# Patient Record
Sex: FEMALE | Race: WHITE | NOT HISPANIC OR LATINO | Employment: OTHER | URBAN - METROPOLITAN AREA
[De-identification: names, ages, dates, MRNs, and addresses within clinical notes are randomized per-mention and may not be internally consistent; named-entity substitution may affect disease eponyms.]

---

## 2019-10-01 ENCOUNTER — HOSPITAL ENCOUNTER (EMERGENCY)
Facility: HOSPITAL | Age: 24
Discharge: HOME | End: 2019-10-01
Attending: EMERGENCY MEDICINE
Payer: MEDICAID

## 2019-10-01 VITALS
WEIGHT: 143.5 LBS | OXYGEN SATURATION: 99 % | RESPIRATION RATE: 18 BRPM | SYSTOLIC BLOOD PRESSURE: 129 MMHG | BODY MASS INDEX: 24.25 KG/M2 | HEART RATE: 109 BPM | TEMPERATURE: 98 F | DIASTOLIC BLOOD PRESSURE: 62 MMHG

## 2019-10-01 DIAGNOSIS — L23.81 ALLERGIC CONTACT DERMATITIS DUE TO ANIMAL DANDER: Primary | ICD-10-CM

## 2019-10-01 DIAGNOSIS — F41.9 ANXIETY: ICD-10-CM

## 2019-10-01 PROBLEM — O99.331 SMOKING (TOBACCO) COMPLICATING PREGNANCY, FIRST TRIMESTER: Status: ACTIVE | Noted: 2017-03-16

## 2019-10-01 PROBLEM — F31.9 BIPOLAR DISORDER: Status: ACTIVE | Noted: 2017-02-09

## 2019-10-01 PROBLEM — N92.6 IRREGULAR PERIODS: Status: ACTIVE | Noted: 2019-10-01

## 2019-10-01 PROBLEM — F90.0 ATTENTION DEFICIT HYPERACTIVITY DISORDER, PREDOMINANTLY INATTENTIVE TYPE: Status: ACTIVE | Noted: 2017-02-09

## 2019-10-01 PROBLEM — F41.1 GENERALIZED ANXIETY DISORDER: Status: ACTIVE | Noted: 2017-03-09

## 2019-10-01 PROBLEM — F91.3 OPPOSITIONAL DEFIANT DISORDER: Status: ACTIVE | Noted: 2017-02-09

## 2019-10-01 PROCEDURE — 99281 EMR DPT VST MAYX REQ PHY/QHP: CPT | Mod: ER

## 2019-10-02 NOTE — ED NOTES
Patient examined, evaluated, and educated on discharge prescriptions and instructions by Jeffrey AKBAR. Patient discharged to lobby by NP.

## 2019-10-02 NOTE — ED PROVIDER NOTES
"Encounter Date: 10/1/2019       History     Chief Complaint   Patient presents with    Foot Swelling     bilateral since this am.      The history is provided by the patient.   Rash    This is a new problem. The current episode started today. The problem has been unchanged. The problem is associated with animal contact. The rash is present on the right arm, back, left foot, right foot and left upper leg. The pain is at a severity of 9/10. The pain has been intermittent since onset. Associated symptoms include itching. Pertinent negatives include no blisters, no pain and no weeping. She has tried nothing for the symptoms. The treatment provided no relief. Risk factors include new environmental exposures.   The patient reports having "bilateral feet swelling" that started today. The patient states she was around dogs recently which she is allergic to, and attributes her foot swelling to this. The patient reports itching, bumps, and swelling of skin. The patient also states she has a psychiatric history and would like some resources for help with anxiety. She denies taking any medication at this time, and thinks she would benefit from outpatient treatment. The patient denies SI/HI, is alert/oriented, appropriate during exam, denies depressive or manic episodes. She reports anxiety associated with methamphetamine use. Also she admits to marijuana use.       PCP:    Jacob Vidal MD        Review of patient's allergies indicates:  No Known Allergies  Past Medical History:   Diagnosis Date    Attention deficit hyperactivity disorder, predominantly inattentive type 2017    Bipolar 1 disorder     Chlamydia infection     positive with pregnancy    Depression     Generalized anxiety disorder 3/9/2017    Irregular periods 10/1/2019    Mental disorder     anxiety    Noncompliance     Oppositional defiant disorder 2017     Past Surgical History:   Procedure Laterality Date     SECTION      " CHOLECYSTECTOMY  09/27/2011     Family History   Problem Relation Age of Onset    Kidney disease Mother         kidney stones    Ovarian cancer Mother     Diabetes Father     Breast cancer Neg Hx     Colon cancer Neg Hx      Social History     Tobacco Use    Smoking status: Current Every Day Smoker     Packs/day: 0.50     Years: 5.00     Pack years: 2.50     Types: Cigarettes    Smokeless tobacco: Never Used   Substance Use Topics    Alcohol use: No    Drug use: Yes     Comment: Former use of mariquana-last use , last year     Review of Systems   Constitutional: Negative for fever.   HENT: Negative for sore throat.    Respiratory: Negative for shortness of breath.    Cardiovascular: Negative for chest pain.   Gastrointestinal: Negative for nausea.   Genitourinary: Negative for dysuria.   Musculoskeletal: Negative for back pain.   Skin: Positive for itching and rash (R antecubital fossa, right upper arm, left upper thigh, and lower back.).   Neurological: Negative for weakness.   Hematological: Does not bruise/bleed easily.   Psychiatric/Behavioral: Negative for agitation, behavioral problems, confusion, decreased concentration, dysphoric mood, hallucinations, self-injury, sleep disturbance and suicidal ideas. The patient is nervous/anxious (reports). The patient is not hyperactive.        Physical Exam     Initial Vitals [10/01/19 1848]   BP Pulse Resp Temp SpO2   129/62 109 18 98.4 °F (36.9 °C) 99 %      MAP       --         Physical Exam    Nursing note and vitals reviewed.  Constitutional: She appears well-developed and well-nourished. She is cooperative. She does not appear ill. No distress.   HENT:   Head: Normocephalic and atraumatic.   Nose: Nose normal.   Mouth/Throat: Uvula is midline, oropharynx is clear and moist and mucous membranes are normal.   Eyes: Conjunctivae, EOM and lids are normal. Pupils are equal, round, and reactive to light.   Neck: Trachea normal and normal range of motion. Neck  supple.   Cardiovascular: Regular rhythm, intact distal pulses and normal pulses.   Pulmonary/Chest: Effort normal and breath sounds normal. No respiratory distress. She has no wheezes. She has no rhonchi. She has no rales. She exhibits no tenderness.   Abdominal: Soft. She exhibits no distension and no mass. There is no tenderness. There is no rebound and no guarding.   Musculoskeletal: Normal range of motion. She exhibits no edema or tenderness.   Neurological: She is alert and oriented to person, place, and time. She has normal strength. No sensory deficit. Gait normal. GCS score is 15. GCS eye subscore is 4. GCS verbal subscore is 5. GCS motor subscore is 6.   Neurovascular intact to all extremities.    Skin: Skin is warm, dry and intact. Capillary refill takes less than 2 seconds. Lesion and rash noted. No abrasion, no bruising, no burn, no ecchymosis, no laceration and no abscess noted. Rash is papular and maculopapular. No erythema.        Psychiatric: She has a normal mood and affect. Her behavior is normal. Judgment and thought content normal. Her mood appears not anxious (though patient reports having anxiety, the patient did not appear anxious at the time of the exam.). Her affect is not labile and not inappropriate. Her speech is not rapid and/or pressured and not slurred. She is not agitated, not aggressive, not actively hallucinating and not combative. Thought content is not paranoid and not delusional. She does not express impulsivity or inappropriate judgment. She does not exhibit a depressed mood. She expresses no homicidal and no suicidal ideation. She expresses no suicidal plans and no homicidal plans.   The patient behavior and thought process appeared appropriate to situation, mood was calm, cooperative, and attentive. The patient did not appear anxious, speech fluent and consistent.  She is attentive.         ED Course         1920 HOURS RE-EVALUATION & DISPOSITION:   Reassessment at the time  of disposition demonstrates that the patient is resting comfortably in no acute distress.  She has remained hemodynamically stable throughout the entire ED visit and is without objective evidence for acute process requiring urgent intervention or hospitalization. I discussed test results and provided counseling to patient with regard to condition, the treatment plan, specific conditions for return, and the importance of follow up.  Answered questions at this time. The patient is stable for discharge.                        Clinical Impression:       ICD-10-CM ICD-9-CM   1. Allergic contact dermatitis due to animal dander- bilateral feet L23.81 692.84   2. Anxiety F41.9 300.00               Disposition:   Disposition: Discharged  Condition: Stable  I discussed with patient that the evaluation in the emergency department does not suggest any emergent or life threatening medical condition requiring immediate intervention beyond what was provided in the ED, and I believe patient is safe for discharge.  Regardless, an unremarkable evaluation in the ED does not preclude the development or presence of a serious of life threatening condition. As such, patient was instructed to return immediately for any worsening or change in current symptoms. I also discussed the results of my evaluation and diagnosis with patient and she concurs with the evaluation and management plan.  Detailed written and verbal instructions provided to patient and she expressed a verbal understanding of the discharge instructions and overall management plan. Reiterated the importance of medication administration and safety and advised patient to follow up with primary care provider in 3-5 days or sooner if needed.  Also advised patient to return to the ER for any complications.     Discussed possible causes of dermatitis such as: wearing certain metals (nickel or chromium); use of various make-ups, cleaning products, latex gloves, and medicines;  "contact with certain plants such as poison ivy, poison oak, or poison sumac; and use of irritants or allergens. For prevention, I discussed the importance of identifying substances that are causing rash or skin irritation and avoiding skin contact with the substance. For treatment: I recommended the patient use cool, wet compresses or baths to help soothe skin and lubricate skin with "fragrance-free" or "unscented" creams and lotions often.    Regarding ANXIETY, I discussed signs and symptoms of anxiety with patient including: tachycardia, dysrhythmias, tachypnea, diaphoresis, trembling, dizziness, diarrhea, dry mouth, and difficulty swallowing.  Patient was encouraged to eat a well-balanced, healthy diet; get plenty of rest; exercise daily; limit caffeine and alcohol intake; participate in meditation; talk with family and/or friends about things that may be considered stressful; and keep a diary of feelings and stress triggers.  Patient was instructed to take medications as prescribed and follow up with primary care provider for long term management. I recommended that the patient return to the emergency department if they: feel lightheaded or too dizzy to stand up; develop feelings that they want to hurt themselves or someone else; or develop chest pain, tightness, or heaviness that radiates to the shoulders, arms, jaw, neck, or back.     Discussed with patient importance of complying with treatment program.  I stressed the importance of open communication with health care team expressing any difficulties associated with the plan of care and any reasons for failing to take medications or receiving follow up care.   I explained to the patient that for an optimal recovery, the patient is ultimately responsible for complying with the individualized treatment plan provided today by medical professionals.  This includes taking medications as directed, reviewing and understanding all discharge instructions, and " scheduling any appointments that were provided in the follow-up recommendations.  I further explained to the patient that failing to take responsibility for their health and safety and failing to comply with the recommendations provided today is deemed to be against our medical advice.  The patient was provided clear and oral details regarding alternatives, benefits, risks, and complications related to your condition. In addition, I reiterated the seriousness of the patient's condition and my recommendations for urgent follow-up care with a qualified healthcare provider capable of addressing their needs.  The patient was made aware of the serious complications of their condition which may include stroke, permanent disability, loss of limb(s), and death.          Follow-up Information     Schedule an appointment as soon as possible for a visit  with Jacob Vidal MD.    Specialty:  Family Medicine  Contact information:  [address]  [facility] [id number] [phone]                      Jeffrey Ball NP  10/01/19 2184

## 2020-02-08 ENCOUNTER — HOSPITAL ENCOUNTER (EMERGENCY)
Facility: HOSPITAL | Age: 25
Discharge: HOME | End: 2020-02-08
Attending: EMERGENCY MEDICINE
Payer: MEDICAID

## 2020-02-08 VITALS
RESPIRATION RATE: 16 BRPM | OXYGEN SATURATION: 100 % | TEMPERATURE: 98 F | BODY MASS INDEX: 24.87 KG/M2 | SYSTOLIC BLOOD PRESSURE: 111 MMHG | HEART RATE: 104 BPM | DIASTOLIC BLOOD PRESSURE: 62 MMHG | HEIGHT: 65 IN | WEIGHT: 149.25 LBS

## 2020-02-08 DIAGNOSIS — L03.011 PARONYCHIA OF FINGER OF RIGHT HAND: Primary | ICD-10-CM

## 2020-02-08 PROCEDURE — 10060 I&D ABSCESS SIMPLE/SINGLE: CPT | Mod: F5,ER

## 2020-02-08 PROCEDURE — 99283 EMERGENCY DEPT VISIT LOW MDM: CPT | Mod: 25,ER

## 2020-02-08 RX ORDER — CLINDAMYCIN HYDROCHLORIDE 150 MG/1
450 CAPSULE ORAL EVERY 8 HOURS
Qty: 45 CAPSULE | Refills: 0 | Status: SHIPPED | OUTPATIENT
Start: 2020-02-08 | End: 2020-02-13

## 2020-02-08 NOTE — ED NOTES
Patient complains of right thumb swelling and infection. Patient states she went to [facility] for this x1 month ago, but never picked up prescription for abx because patient stated she was out of space.     Level of Consciousness: Patient is awake, alert, and oriented to person, place, time, and situation. Speech is clear.   HEENT: Symmetrical face, PERRLA, moist mucous membranes, no congestion/drainage, no JVD, pt able to swallow.   Appearance: Patient resting comfortably in bed, hygiene and clothing are both intact and appropriate.   Skin: Skin is warm, dry, and intact. Skin is of normal color, free of any skin breakdown. Patient has generalized scaring. Mucus membranes pink and moist.   Musculoskeletal: Moves all extremities well. Full active ROM. No deformities noted. Denies any weakness. Gait steady, patient ambulates independently, without assistive device.   Respiratory: Airway open and patent. Respirations equal and unlabored. Patient denies any SOB.   Cardiac: No peripheral edema noted to bilateral lower extremities. Denies any chest pain or discomfort.   GI: Abdomen soft, non-tender. Bowel sounds present and active in all quadrants x 4. No distention noted. Denies any nausea or vomiting. Denies problem with BM.  : Denies any problem with urination.  Neurological: Normal sensation reported to all extremities. Symmetrical expressions noted to face. No obvious neurological deficits noted.   Psychosocial: Patient is calm and cooperative, appropriate to situation. Family is involved in patient care, boyfriend at bedside.    Patient informed of plan of care, verbalizes understanding, and has no questions or concern at this time. Bed is in the lowest position and locked. Call bell within reach of the patient. Will continue to monitor.

## 2020-02-08 NOTE — ED PROVIDER NOTES
Encounter Date: 2020       History     Chief Complaint   Patient presents with    Wound Infection     right thumb infection; [facility] infected finger and never picked up abx.     The history is provided by the patient.   Abscess    This is a new problem. The current episode started several days ago. The problem occurs occasionally. The problem has been unchanged. The abscess is present on the right hand. The abscess is characterized by redness and draining. Pertinent negatives include no fever and no sore throat.     Review of patient's allergies indicates:  No Known Allergies  Past Medical History:   Diagnosis Date    Attention deficit hyperactivity disorder, predominantly inattentive type 2017    Bipolar 1 disorder     Chlamydia infection     positive with pregnancy    Depression     Generalized anxiety disorder 3/9/2017    Irregular periods 10/1/2019    Mental disorder     anxiety    Noncompliance     Oppositional defiant disorder 2017     Past Surgical History:   Procedure Laterality Date     SECTION      CHOLECYSTECTOMY  2011     Family History   Problem Relation Age of Onset    Kidney disease Mother         kidney stones    Ovarian cancer Mother     Diabetes Father     Breast cancer Neg Hx     Colon cancer Neg Hx      Social History     Tobacco Use    Smoking status: Current Every Day Smoker     Packs/day: 0.50     Years: 5.00     Pack years: 2.50     Types: Cigarettes    Smokeless tobacco: Never Used   Substance Use Topics    Alcohol use: No    Drug use: Yes     Types: Marijuana     Review of Systems   Constitutional: Negative for fever.   HENT: Negative for sore throat.    Respiratory: Negative for shortness of breath.    Cardiovascular: Negative for chest pain.   Gastrointestinal: Negative for nausea.   Genitourinary: Negative for dysuria.   Musculoskeletal: Negative for back pain.   Skin: Negative for rash.   Neurological: Negative for  weakness.   Hematological: Does not bruise/bleed easily.       Physical Exam     Initial Vitals [02/08/20 1035]   BP Pulse Resp Temp SpO2   111/62 104 16 98.3 °F (36.8 °C) 100 %      MAP       --         Physical Exam    Nursing note and vitals reviewed.  Constitutional: She appears well-developed and well-nourished. No distress.   HENT:   Head: Normocephalic and atraumatic.   Mouth/Throat: Oropharynx is clear and moist.   Eyes: Conjunctivae and EOM are normal. Pupils are equal, round, and reactive to light.   Neck: Normal range of motion. Neck supple.   Cardiovascular: Normal rate, regular rhythm and normal heart sounds. Exam reveals no gallop and no friction rub.    No murmur heard.  Pulmonary/Chest: Breath sounds normal. No respiratory distress. She has no wheezes. She has no rhonchi. She has no rales.   Abdominal: Soft. Bowel sounds are normal. She exhibits no distension and no mass. There is no tenderness. There is no rebound and no guarding.   Musculoskeletal: Normal range of motion. She exhibits no edema or tenderness.   Neurological: She is alert and oriented to person, place, and time. She has normal strength.   Skin: Skin is warm and dry. No rash noted.        Psychiatric: She has a normal mood and affect. Thought content normal.         ED Course   I & D - Incision and Drainage  Date/Time: 2/8/2020 10:50 AM  Performed by: Handy Ortiz MD  Authorized by: Handy Ortiz MD   Type: abscess  Body area: upper extremity  Location details: right thumb  Scalpel size: 11  Incision type: single straight  Complexity: simple  Drainage: pus and  purulent  Drainage amount: moderate  Wound treatment: wound left open  Patient tolerance: Patient tolerated the procedure well with no immediate complications        Labs Reviewed - No data to display       Imaging Results    None                                          Clinical Impression:       ICD-10-CM ICD-9-CM   1. Paronychia of finger of right hand L03.011  681.02           Disposition:   Disposition: Discharged  Condition: Stable                     Handy Ortiz MD  02/08/20 1051

## 2020-02-25 PROBLEM — R45.851 DEPRESSION WITH SUICIDAL IDEATION: Status: ACTIVE | Noted: 2020-02-25

## 2020-02-25 PROBLEM — F32.A DEPRESSION WITH SUICIDAL IDEATION: Status: ACTIVE | Noted: 2020-02-25

## 2020-02-26 PROBLEM — R05.9 COUGH: Status: ACTIVE | Noted: 2020-02-26

## 2020-02-26 PROBLEM — J06.9 UPPER RESPIRATORY INFECTION: Status: ACTIVE | Noted: 2020-02-26

## 2021-04-28 ENCOUNTER — PATIENT MESSAGE (OUTPATIENT)
Dept: RESEARCH | Facility: HOSPITAL | Age: 26
End: 2021-04-28